# Patient Record
Sex: MALE | Race: WHITE | NOT HISPANIC OR LATINO | Employment: OTHER
[De-identification: names, ages, dates, MRNs, and addresses within clinical notes are randomized per-mention and may not be internally consistent; named-entity substitution may affect disease eponyms.]

---

## 2020-09-01 ENCOUNTER — OFFICE VISIT (OUTPATIENT)
Age: 80
End: 2020-09-01

## 2020-09-29 ENCOUNTER — OFFICE VISIT (OUTPATIENT)
Dept: URBAN - METROPOLITAN AREA CLINIC 9 | Facility: CLINIC | Age: 80
End: 2020-09-29

## 2020-10-07 ENCOUNTER — OFFICE VISIT (OUTPATIENT)
Dept: URBAN - METROPOLITAN AREA CLINIC 9 | Facility: CLINIC | Age: 80
End: 2020-10-07

## 2022-06-01 ENCOUNTER — OFFICE VISIT (OUTPATIENT)
Age: 82
End: 2022-06-01

## 2022-06-01 ENCOUNTER — TELEPHONE ENCOUNTER (OUTPATIENT)
Dept: URBAN - METROPOLITAN AREA CLINIC 9 | Facility: CLINIC | Age: 82
End: 2022-06-01

## 2022-06-06 ENCOUNTER — OFFICE VISIT (OUTPATIENT)
Dept: URBAN - METROPOLITAN AREA CLINIC 9 | Facility: CLINIC | Age: 82
End: 2022-06-06

## 2022-07-30 ENCOUNTER — TELEPHONE ENCOUNTER (OUTPATIENT)
Age: 82
End: 2022-07-30

## 2022-07-30 RX ORDER — OMEPRAZOLE 20 MG/1
2 (TWO) CAPSULE, DELAYED RELEASE ORAL DAILY
Qty: 0 | Refills: 2 | OUTPATIENT
Start: 2019-02-19 | End: 2019-03-21

## 2022-07-31 ENCOUNTER — TELEPHONE ENCOUNTER (OUTPATIENT)
Age: 82
End: 2022-07-31

## 2022-07-31 RX ORDER — OMEPRAZOLE 40 MG/1
1 (ONE) CAPSULE, DELAYED RELEASE ORAL
Qty: 0 | Refills: 16 | Status: ACTIVE | COMMUNITY
Start: 2020-09-29

## 2022-07-31 RX ORDER — OMEPRAZOLE 40 MG/1
1 (ONE) CAPSULE, DELAYED RELEASE ORAL
Qty: 0 | Refills: 16 | Status: ACTIVE | COMMUNITY
Start: 2022-06-06

## 2022-07-31 RX ORDER — OMEPRAZOLE 20 MG/1
2 (TWO) CAPSULE, DELAYED RELEASE ORAL DAILY
Qty: 0 | Refills: 2 | Status: ACTIVE | COMMUNITY
Start: 2019-02-19

## 2022-08-24 ENCOUNTER — WEB ENCOUNTER (OUTPATIENT)
Dept: URBAN - METROPOLITAN AREA CLINIC 9 | Facility: CLINIC | Age: 82
End: 2022-08-24

## 2022-08-24 ENCOUNTER — OFFICE VISIT (OUTPATIENT)
Dept: URBAN - METROPOLITAN AREA CLINIC 9 | Facility: CLINIC | Age: 82
End: 2022-08-24
Payer: MEDICARE

## 2022-08-24 VITALS
SYSTOLIC BLOOD PRESSURE: 118 MMHG | DIASTOLIC BLOOD PRESSURE: 76 MMHG | BODY MASS INDEX: 29.12 KG/M2 | WEIGHT: 208 LBS | HEIGHT: 71 IN

## 2022-08-24 DIAGNOSIS — R13.10 DYSPHAGIA, UNSPECIFIED TYPE: ICD-10-CM

## 2022-08-24 DIAGNOSIS — K22.70 BARRETT'S ESOPHAGUS WITHOUT DYSPLASIA: ICD-10-CM

## 2022-08-24 DIAGNOSIS — K91.89 GASTROJEJUNAL ANASTOMOTIC STRICTURE: ICD-10-CM

## 2022-08-24 PROCEDURE — 99214 OFFICE O/P EST MOD 30 MIN: CPT | Performed by: INTERNAL MEDICINE

## 2022-08-24 RX ORDER — MIRTAZAPINE 15 MG/1
TAKE ONE TABLET BY MOUTH EVERY NIGHT TABLET ORAL
Qty: 30 UNSPECIFIED | Refills: 3 | Status: ACTIVE | COMMUNITY

## 2022-08-24 RX ORDER — INSULIN GLARGINE 100 [IU]/ML
INJECT TWENTY UNITS UNDER THE SKIN ONE TIME DAILY AT NIGHT INJECTION, SOLUTION SUBCUTANEOUS
Qty: 15 UNSPECIFIED | Refills: 1 | Status: ACTIVE | COMMUNITY

## 2022-08-24 RX ORDER — OMEPRAZOLE 20 MG/1
1 TABLET CAPSULE, DELAYED RELEASE ORAL
Qty: 30 | Refills: 11 | OUTPATIENT
Start: 2019-02-19

## 2022-08-24 RX ORDER — TAMSULOSIN HYDROCHLORIDE 0.4 MG/1
TAKE ONE CAPSULE BY MOUTH ONE TIME DAILY CAPSULE ORAL
Qty: 90 UNSPECIFIED | Refills: 1 | Status: ACTIVE | COMMUNITY

## 2022-08-24 RX ORDER — METFORMIN HYDROCHLORIDE 500 MG/1
TAKE ONE TABLET BY MOUTH ONE TIME DAILY WITH BREAKFAST TABLET, EXTENDED RELEASE ORAL
Qty: 90 UNSPECIFIED | Refills: 0 | Status: ACTIVE | COMMUNITY

## 2022-08-24 RX ORDER — OMEPRAZOLE 40 MG/1
1 (ONE) CAPSULE, DELAYED RELEASE ORAL
Qty: 0 | Refills: 16 | Status: ON HOLD | COMMUNITY
Start: 2022-06-06

## 2022-08-24 RX ORDER — OMEPRAZOLE 20 MG/1
2 (TWO) CAPSULE, DELAYED RELEASE ORAL DAILY
Qty: 0 | Refills: 2 | Status: ACTIVE | COMMUNITY
Start: 2019-02-19

## 2022-08-24 RX ORDER — SITAGLIPTIN 100 MG/1
TABLET, FILM COATED ORAL
Qty: 90 TABLET | Status: ACTIVE | COMMUNITY

## 2022-08-24 RX ORDER — PRAVASTATIN SODIUM 10 MG/1
TAKE ONE TABLET BY MOUTH ONE TIME DAILY AT BEDTIME TABLET ORAL
Qty: 30 UNSPECIFIED | Refills: 0 | Status: ACTIVE | COMMUNITY

## 2022-08-24 NOTE — HPI-TODAY'S VISIT:
Pt here for evaluation of new dysphagia and Ordonez's.  . Pt with hx/o gricelda en y gastric bypass. . pt last colonoscopy was in 2010 and he is avg risk. . pt with esophgitis and dysphagia in 2016 with Ordonez's, dilation and started on PPI and now sx are much improved. his dysphagia has not recurred suggesting that it was due to GERD. . EGD 3/2019 with neg for ordonez's but prior EGDs were positive for ordonez's . he was doing well on PPI. . Pt here now with dysphagia new since 6/2022. He has no melena or weight changes but is having issues with the dysphagia and coughing., he is still on PPI. Due to lack of recent EGD, new onset dysphagia sx and Ordonez's EGD is warranted with dilation. Will arrange. He is agreeable.  .

## 2022-08-31 ENCOUNTER — CLAIMS CREATED FROM THE CLAIM WINDOW (OUTPATIENT)
Dept: URBAN - METROPOLITAN AREA SURGERY CENTER 9 | Facility: SURGERY CENTER | Age: 82
End: 2022-08-31
Payer: MEDICARE

## 2022-08-31 ENCOUNTER — CLAIMS CREATED FROM THE CLAIM WINDOW (OUTPATIENT)
Dept: URBAN - METROPOLITAN AREA CLINIC 8 | Facility: CLINIC | Age: 82
End: 2022-08-31
Payer: MEDICARE

## 2022-08-31 DIAGNOSIS — R13.10 DYSPHAGIA, UNSPECIFIED TYPE: ICD-10-CM

## 2022-08-31 DIAGNOSIS — Z98.0 H/O BILLROTH II OPERATION: ICD-10-CM

## 2022-08-31 DIAGNOSIS — K22.89 DILATATION OF ESOPHAGUS: ICD-10-CM

## 2022-08-31 DIAGNOSIS — R13.10 ABNORMAL DEGLUTITION: ICD-10-CM

## 2022-08-31 DIAGNOSIS — T18.128A FOOD IMPACTION OF ESOPHAGUS: ICD-10-CM

## 2022-08-31 PROBLEM — 40739000: Status: ACTIVE | Noted: 2022-08-24

## 2022-08-31 PROCEDURE — 88305 TISSUE EXAM BY PATHOLOGIST: CPT | Performed by: INTERNAL MEDICINE

## 2022-08-31 PROCEDURE — 43248 EGD GUIDE WIRE INSERTION: CPT | Performed by: CLINIC/CENTER

## 2022-08-31 PROCEDURE — 43248 EGD GUIDE WIRE INSERTION: CPT | Performed by: INTERNAL MEDICINE

## 2022-08-31 PROCEDURE — 43247 EGD REMOVE FOREIGN BODY: CPT | Performed by: INTERNAL MEDICINE

## 2022-08-31 PROCEDURE — 43247 EGD REMOVE FOREIGN BODY: CPT | Performed by: CLINIC/CENTER

## 2022-08-31 RX ORDER — MIRTAZAPINE 15 MG/1
TAKE ONE TABLET BY MOUTH EVERY NIGHT TABLET ORAL
Qty: 30 UNSPECIFIED | Refills: 3 | Status: ACTIVE | COMMUNITY

## 2022-08-31 RX ORDER — OMEPRAZOLE 40 MG/1
1 (ONE) CAPSULE, DELAYED RELEASE ORAL
Qty: 0 | Refills: 16 | Status: ON HOLD | COMMUNITY
Start: 2022-06-06

## 2022-08-31 RX ORDER — PRAVASTATIN SODIUM 10 MG/1
TAKE ONE TABLET BY MOUTH ONE TIME DAILY AT BEDTIME TABLET ORAL
Qty: 30 UNSPECIFIED | Refills: 0 | Status: ACTIVE | COMMUNITY

## 2022-08-31 RX ORDER — METFORMIN HYDROCHLORIDE 500 MG/1
TAKE ONE TABLET BY MOUTH ONE TIME DAILY WITH BREAKFAST TABLET, EXTENDED RELEASE ORAL
Qty: 90 UNSPECIFIED | Refills: 0 | Status: ACTIVE | COMMUNITY

## 2022-08-31 RX ORDER — INSULIN GLARGINE 100 [IU]/ML
INJECT TWENTY UNITS UNDER THE SKIN ONE TIME DAILY AT NIGHT INJECTION, SOLUTION SUBCUTANEOUS
Qty: 15 UNSPECIFIED | Refills: 1 | Status: ACTIVE | COMMUNITY

## 2022-08-31 RX ORDER — SITAGLIPTIN 100 MG/1
TABLET, FILM COATED ORAL
Qty: 90 TABLET | Status: ACTIVE | COMMUNITY

## 2022-08-31 RX ORDER — TAMSULOSIN HYDROCHLORIDE 0.4 MG/1
TAKE ONE CAPSULE BY MOUTH ONE TIME DAILY CAPSULE ORAL
Qty: 90 UNSPECIFIED | Refills: 1 | Status: ACTIVE | COMMUNITY

## 2022-08-31 RX ORDER — OMEPRAZOLE 20 MG/1
1 TABLET CAPSULE, DELAYED RELEASE ORAL
Qty: 30 | Refills: 11 | Status: ACTIVE | COMMUNITY
Start: 2019-02-19

## 2022-09-01 ENCOUNTER — LAB OUTSIDE AN ENCOUNTER (OUTPATIENT)
Dept: URBAN - METROPOLITAN AREA CLINIC 9 | Facility: CLINIC | Age: 82
End: 2022-09-01

## 2022-09-01 ENCOUNTER — TELEPHONE ENCOUNTER (OUTPATIENT)
Dept: URBAN - METROPOLITAN AREA CLINIC 9 | Facility: CLINIC | Age: 82
End: 2022-09-01

## 2022-11-18 ENCOUNTER — OFFICE VISIT (OUTPATIENT)
Dept: URBAN - METROPOLITAN AREA CLINIC 9 | Facility: CLINIC | Age: 82
End: 2022-11-18
Payer: MEDICARE

## 2022-11-18 VITALS
BODY MASS INDEX: 29.96 KG/M2 | DIASTOLIC BLOOD PRESSURE: 68 MMHG | SYSTOLIC BLOOD PRESSURE: 126 MMHG | WEIGHT: 214 LBS | HEIGHT: 71 IN

## 2022-11-18 DIAGNOSIS — K91.89 GASTROJEJUNAL ANASTOMOTIC STRICTURE: ICD-10-CM

## 2022-11-18 DIAGNOSIS — K22.70 BARRETT'S ESOPHAGUS WITHOUT DYSPLASIA: ICD-10-CM

## 2022-11-18 DIAGNOSIS — K22.0 ACHALASIA: ICD-10-CM

## 2022-11-18 PROBLEM — 45564002: Status: ACTIVE | Noted: 2022-11-18

## 2022-11-18 PROCEDURE — 99213 OFFICE O/P EST LOW 20 MIN: CPT | Performed by: INTERNAL MEDICINE

## 2022-11-18 RX ORDER — METFORMIN HYDROCHLORIDE 500 MG/1
TAKE ONE TABLET BY MOUTH ONE TIME DAILY WITH BREAKFAST TABLET, EXTENDED RELEASE ORAL
Qty: 90 UNSPECIFIED | Refills: 0 | Status: ACTIVE | COMMUNITY

## 2022-11-18 RX ORDER — PRAVASTATIN SODIUM 10 MG/1
TAKE ONE TABLET BY MOUTH ONE TIME DAILY AT BEDTIME TABLET ORAL
Qty: 30 UNSPECIFIED | Refills: 0 | Status: ACTIVE | COMMUNITY

## 2022-11-18 RX ORDER — MIRTAZAPINE 15 MG/1
TAKE ONE TABLET BY MOUTH EVERY NIGHT TABLET ORAL
Qty: 30 UNSPECIFIED | Refills: 3 | Status: ACTIVE | COMMUNITY

## 2022-11-18 RX ORDER — SITAGLIPTIN 100 MG/1
TABLET, FILM COATED ORAL
Qty: 90 TABLET | Status: ACTIVE | COMMUNITY

## 2022-11-18 RX ORDER — OMEPRAZOLE 40 MG/1
1 (ONE) CAPSULE, DELAYED RELEASE ORAL
Qty: 0 | Refills: 16 | Status: ON HOLD | COMMUNITY
Start: 2022-06-06

## 2022-11-18 RX ORDER — OMEPRAZOLE 20 MG/1
1 TABLET CAPSULE, DELAYED RELEASE ORAL
OUTPATIENT
Start: 2019-02-19

## 2022-11-18 RX ORDER — TAMSULOSIN HYDROCHLORIDE 0.4 MG/1
TAKE ONE CAPSULE BY MOUTH ONE TIME DAILY CAPSULE ORAL
Qty: 90 UNSPECIFIED | Refills: 1 | Status: ACTIVE | COMMUNITY

## 2022-11-18 RX ORDER — INSULIN GLARGINE 100 [IU]/ML
INJECT TWENTY UNITS UNDER THE SKIN ONE TIME DAILY AT NIGHT INJECTION, SOLUTION SUBCUTANEOUS
Qty: 15 UNSPECIFIED | Refills: 1 | Status: ACTIVE | COMMUNITY

## 2022-11-18 RX ORDER — OMEPRAZOLE 20 MG/1
1 TABLET CAPSULE, DELAYED RELEASE ORAL
Qty: 30 | Refills: 11 | Status: ACTIVE | COMMUNITY
Start: 2019-02-19

## 2022-11-18 NOTE — HPI-TODAY'S VISIT:
Pt here for f/u of Ordonez's and dysphagia . Pt with hx/o gricelda en y gastric bypass. . pt last colonoscopy was in 2010 and he is avg risk. . pt with esophgitis and dysphagia in 2016 with Ordonez's, dilation and started on PPI and now sx are much improved. his dysphagia has not recurred suggesting that it was due to GERD. . EGD 3/2019 with neg for ordonez's but prior EGDs were positive for ordonez's 2022 EGD with pennies in esophagus, suggestion of achalasia. Bx neg for cancer or ordonez's. . he was doing well on PPI. . Pt here for f/u. he is doing great and not having any more dysphagia. We discussed his possible achalasia. He desires no interevetion or testing at this point given lack of symptoms. Will cont PPI for ordonez's and rtc 1 year. he will call if his dysphagia returns.  .

## 2023-04-04 ENCOUNTER — WEB ENCOUNTER (OUTPATIENT)
Dept: URBAN - METROPOLITAN AREA CLINIC 9 | Facility: CLINIC | Age: 83
End: 2023-04-04

## 2023-04-04 ENCOUNTER — OFFICE VISIT (OUTPATIENT)
Dept: URBAN - METROPOLITAN AREA CLINIC 9 | Facility: CLINIC | Age: 83
End: 2023-04-04
Payer: MEDICARE

## 2023-04-04 VITALS
DIASTOLIC BLOOD PRESSURE: 82 MMHG | SYSTOLIC BLOOD PRESSURE: 140 MMHG | WEIGHT: 222 LBS | HEIGHT: 71 IN | BODY MASS INDEX: 31.08 KG/M2

## 2023-04-04 DIAGNOSIS — J30.89 NON-SEASONAL ALLERGIC RHINITIS, UNSPECIFIED TRIGGER: ICD-10-CM

## 2023-04-04 DIAGNOSIS — K91.89 GASTROJEJUNAL ANASTOMOTIC STRICTURE: ICD-10-CM

## 2023-04-04 DIAGNOSIS — K22.70 BARRETT'S ESOPHAGUS WITHOUT DYSPLASIA: ICD-10-CM

## 2023-04-04 PROBLEM — 61582004: Status: ACTIVE | Noted: 2023-04-04

## 2023-04-04 PROCEDURE — 99214 OFFICE O/P EST MOD 30 MIN: CPT | Performed by: INTERNAL MEDICINE

## 2023-04-04 RX ORDER — OMEPRAZOLE 40 MG/1
1 (ONE) CAPSULE, DELAYED RELEASE ORAL
Qty: 0 | Refills: 16 | Status: ON HOLD | COMMUNITY
Start: 2022-06-06

## 2023-04-04 RX ORDER — OMEPRAZOLE 20 MG/1
1 TABLET CAPSULE, DELAYED RELEASE ORAL
Status: ACTIVE | COMMUNITY
Start: 2019-02-19

## 2023-04-04 RX ORDER — MIRTAZAPINE 15 MG/1
TAKE ONE TABLET BY MOUTH EVERY NIGHT TABLET ORAL
Qty: 30 UNSPECIFIED | Refills: 3 | Status: ACTIVE | COMMUNITY

## 2023-04-04 RX ORDER — OMEPRAZOLE 40 MG/1
1 TABLET CAPSULE, DELAYED RELEASE ORAL
Qty: 30 | Refills: 5 | OUTPATIENT
Start: 2019-02-19

## 2023-04-04 RX ORDER — PRAVASTATIN SODIUM 10 MG/1
TAKE ONE TABLET BY MOUTH ONE TIME DAILY AT BEDTIME TABLET ORAL
Qty: 30 UNSPECIFIED | Refills: 0 | Status: ACTIVE | COMMUNITY

## 2023-04-04 RX ORDER — INSULIN GLARGINE 100 [IU]/ML
INJECT TWENTY UNITS UNDER THE SKIN ONE TIME DAILY AT NIGHT INJECTION, SOLUTION SUBCUTANEOUS
Qty: 15 UNSPECIFIED | Refills: 1 | Status: ACTIVE | COMMUNITY

## 2023-04-04 RX ORDER — TAMSULOSIN HYDROCHLORIDE 0.4 MG/1
TAKE ONE CAPSULE BY MOUTH ONE TIME DAILY CAPSULE ORAL
Qty: 90 UNSPECIFIED | Refills: 1 | Status: ACTIVE | COMMUNITY

## 2023-04-04 RX ORDER — SITAGLIPTIN 100 MG/1
TABLET, FILM COATED ORAL
Qty: 90 TABLET | Status: ACTIVE | COMMUNITY

## 2023-04-04 RX ORDER — METFORMIN HYDROCHLORIDE 500 MG/1
TAKE ONE TABLET BY MOUTH ONE TIME DAILY WITH BREAKFAST TABLET, EXTENDED RELEASE ORAL
Qty: 90 UNSPECIFIED | Refills: 0 | Status: ACTIVE | COMMUNITY

## 2023-04-04 NOTE — HPI-TODAY'S VISIT:
Pt here for eval of worsening gerd and f/u of Ordonez's and new sinusitis . Pt with hx/o gricelda en y gastric bypass. . pt last colonoscopy was in 2010 and he is avg risk. . pt with esophgitis and dysphagia in 2016 with Ordonez's, dilation and started on PPI and now sx are much improved. his dysphagia has not recurred suggesting that it was due to GERD. . EGD 3/2019 with neg for ordonez's but prior EGDs were positive for ordonez's 2022 EGD with pennies in esophagus, suggestion of achalasia. Bx neg for cancer or ordonez's. . he was doing well on PPI but now has lost response. . Pt here for f/u. he was on ppi 40 mg but for unclear reasons he is back on 20 mg. he has had worsened coughing but is having consideralbe nasal discharge though does have coughing after eating suggesting a gerd component. i advised we increase omeprazole to 40 mg and that we refer to ENT for evaluation for allergic rhinitis. he is agreeable.  .

## 2023-06-21 ENCOUNTER — OFFICE VISIT (OUTPATIENT)
Dept: URBAN - METROPOLITAN AREA CLINIC 9 | Facility: CLINIC | Age: 83
End: 2023-06-21
Payer: MEDICARE

## 2023-06-21 VITALS
HEIGHT: 71 IN | DIASTOLIC BLOOD PRESSURE: 78 MMHG | BODY MASS INDEX: 30.66 KG/M2 | WEIGHT: 219 LBS | SYSTOLIC BLOOD PRESSURE: 130 MMHG

## 2023-06-21 DIAGNOSIS — K91.89 GASTROJEJUNAL ANASTOMOTIC STRICTURE: ICD-10-CM

## 2023-06-21 DIAGNOSIS — K22.0 ACHALASIA: ICD-10-CM

## 2023-06-21 DIAGNOSIS — K22.70 BARRETT'S ESOPHAGUS WITHOUT DYSPLASIA: ICD-10-CM

## 2023-06-21 PROCEDURE — 99213 OFFICE O/P EST LOW 20 MIN: CPT | Performed by: INTERNAL MEDICINE

## 2023-06-21 RX ORDER — ALUMINUM ZIRCONIUM TRICHLOROHYDREX GLY 0.19 G/G
2 TAB STICK TOPICAL ONCE A DAY
Status: ACTIVE | COMMUNITY

## 2023-06-21 RX ORDER — MIRTAZAPINE 15 MG/1
TAKE ONE TABLET BY MOUTH EVERY NIGHT TABLET ORAL
Qty: 30 UNSPECIFIED | Refills: 3 | Status: ACTIVE | COMMUNITY

## 2023-06-21 RX ORDER — INSULIN GLARGINE 100 [IU]/ML
INJECT TWENTY UNITS UNDER THE SKIN ONE TIME DAILY AT NIGHT INJECTION, SOLUTION SUBCUTANEOUS
Qty: 15 UNSPECIFIED | Refills: 1 | Status: ACTIVE | COMMUNITY

## 2023-06-21 RX ORDER — SITAGLIPTIN 100 MG/1
TABLET, FILM COATED ORAL
Qty: 90 TABLET | Status: ACTIVE | COMMUNITY

## 2023-06-21 RX ORDER — FAMOTIDINE 20 MG/1
1 TABLET AT BEDTIME AS NEEDED TABLET, FILM COATED ORAL ONCE A DAY
Qty: 30 | OUTPATIENT
Start: 2023-06-21

## 2023-06-21 RX ORDER — OMEPRAZOLE 40 MG/1
1 TABLET CAPSULE, DELAYED RELEASE ORAL
Qty: 30 | Refills: 5 | Status: ACTIVE | COMMUNITY
Start: 2019-02-19

## 2023-06-21 RX ORDER — OMEPRAZOLE 40 MG/1
1 TABLET CAPSULE, DELAYED RELEASE ORAL
OUTPATIENT
Start: 2019-02-19

## 2023-06-21 RX ORDER — PRAVASTATIN SODIUM 10 MG/1
TAKE ONE TABLET BY MOUTH ONE TIME DAILY AT BEDTIME TABLET ORAL
Qty: 30 UNSPECIFIED | Refills: 0 | Status: ACTIVE | COMMUNITY

## 2023-06-21 RX ORDER — TAMSULOSIN HYDROCHLORIDE 0.4 MG/1
TAKE ONE CAPSULE BY MOUTH ONE TIME DAILY CAPSULE ORAL
Qty: 90 UNSPECIFIED | Refills: 1 | Status: ACTIVE | COMMUNITY

## 2023-06-21 RX ORDER — METFORMIN HYDROCHLORIDE 500 MG/1
TAKE ONE TABLET BY MOUTH ONE TIME DAILY WITH BREAKFAST TABLET, EXTENDED RELEASE ORAL
Qty: 90 UNSPECIFIED | Refills: 0 | Status: ACTIVE | COMMUNITY

## 2023-06-21 NOTE — HPI-TODAY'S VISIT:
Pt here for f/u of cough and gerd and dysphagia. . Pt with hx/o gricelda en y gastric bypass. . pt last colonoscopy was in 2010 and he is avg risk. pt with esophgitis and dysphagia in 2016 with Ordonez's, dilation and started on PPI and now sx are much improved. his dysphagia has not recurred suggesting that it was due to GERD. EGD 3/2019 with neg for ordonez's but prior EGDs were positive for ordonez's 2022 EGD with pennies in esophagus, suggestion of achalasia. Bx neg for cancer or ordonez's. . he was doing well on PPI but now has lost response. . At last visit we increased ppi to 40 mg and added h2 blocker QHS and he is 80% improved. His upper GI showed distal tertiary spasms, delayed esophageal emptying and birds beak suggestive of achalasia. We discussed this finding but given age etc an aggressive achalasia approach would not be ideal. We could consider botox with EGD but he is much improved on higher dose ppi. For now will hold on EGD and cont 40 mg po and h2 blocker QHS. He is agreeable. RTC  6 months.  .

## 2023-10-18 ENCOUNTER — TELEPHONE ENCOUNTER (OUTPATIENT)
Dept: URBAN - METROPOLITAN AREA CLINIC 7 | Facility: CLINIC | Age: 83
End: 2023-10-18

## 2023-10-18 RX ORDER — OMEPRAZOLE 40 MG/1
1 TABLET CAPSULE, DELAYED RELEASE ORAL
Qty: 30 | Refills: 5
Start: 2019-02-19

## 2023-12-20 ENCOUNTER — OFFICE VISIT (OUTPATIENT)
Dept: URBAN - METROPOLITAN AREA CLINIC 9 | Facility: CLINIC | Age: 83
End: 2023-12-20

## 2024-01-10 ENCOUNTER — DASHBOARD ENCOUNTERS (OUTPATIENT)
Age: 84
End: 2024-01-10

## 2024-01-10 ENCOUNTER — TELEPHONE ENCOUNTER (OUTPATIENT)
Dept: URBAN - METROPOLITAN AREA CLINIC 7 | Facility: CLINIC | Age: 84
End: 2024-01-10

## 2024-01-10 ENCOUNTER — OFFICE VISIT (OUTPATIENT)
Dept: URBAN - METROPOLITAN AREA CLINIC 9 | Facility: CLINIC | Age: 84
End: 2024-01-10
Payer: MEDICARE

## 2024-01-10 VITALS
DIASTOLIC BLOOD PRESSURE: 78 MMHG | BODY MASS INDEX: 32.06 KG/M2 | WEIGHT: 229 LBS | SYSTOLIC BLOOD PRESSURE: 128 MMHG | HEIGHT: 71 IN

## 2024-01-10 DIAGNOSIS — K22.0 ACHALASIA: ICD-10-CM

## 2024-01-10 DIAGNOSIS — K22.70 BARRETT'S ESOPHAGUS WITHOUT DYSPLASIA: ICD-10-CM

## 2024-01-10 PROCEDURE — 99214 OFFICE O/P EST MOD 30 MIN: CPT | Performed by: INTERNAL MEDICINE

## 2024-01-10 RX ORDER — OMEPRAZOLE 40 MG/1
1 TABLET CAPSULE, DELAYED RELEASE ORAL
OUTPATIENT
Start: 2019-02-19

## 2024-01-10 RX ORDER — METFORMIN HYDROCHLORIDE 500 MG/1
TAKE ONE TABLET BY MOUTH ONE TIME DAILY WITH BREAKFAST TABLET, EXTENDED RELEASE ORAL
Qty: 90 UNSPECIFIED | Refills: 0 | Status: ACTIVE | COMMUNITY

## 2024-01-10 RX ORDER — TAMSULOSIN HYDROCHLORIDE 0.4 MG/1
TAKE ONE CAPSULE BY MOUTH ONE TIME DAILY CAPSULE ORAL
Qty: 90 UNSPECIFIED | Refills: 1 | Status: ACTIVE | COMMUNITY

## 2024-01-10 RX ORDER — MIRTAZAPINE 15 MG/1
TAKE ONE TABLET BY MOUTH EVERY NIGHT TABLET ORAL
Qty: 30 UNSPECIFIED | Refills: 3 | Status: ACTIVE | COMMUNITY

## 2024-01-10 RX ORDER — ALUMINUM ZIRCONIUM TRICHLOROHYDREX GLY 0.19 G/G
2 TAB STICK TOPICAL ONCE A DAY
Status: ACTIVE | COMMUNITY

## 2024-01-10 RX ORDER — PRAVASTATIN SODIUM 10 MG/1
TAKE ONE TABLET BY MOUTH ONE TIME DAILY AT BEDTIME TABLET ORAL
Qty: 30 UNSPECIFIED | Refills: 0 | Status: ACTIVE | COMMUNITY

## 2024-01-10 RX ORDER — FAMOTIDINE 20 MG/1
1 TABLET AT BEDTIME AS NEEDED TABLET, FILM COATED ORAL ONCE A DAY
Qty: 30 | Status: ACTIVE | COMMUNITY
Start: 2023-06-21

## 2024-01-10 RX ORDER — SITAGLIPTIN 100 MG/1
TABLET, FILM COATED ORAL
Qty: 90 TABLET | Status: ACTIVE | COMMUNITY

## 2024-01-10 RX ORDER — OMEPRAZOLE 40 MG/1
1 TABLET CAPSULE, DELAYED RELEASE ORAL
Qty: 30 | Refills: 5 | Status: ACTIVE | COMMUNITY
Start: 2019-02-19

## 2024-01-10 RX ORDER — INSULIN GLARGINE 100 [IU]/ML
INJECT TWENTY UNITS UNDER THE SKIN ONE TIME DAILY AT NIGHT INJECTION, SOLUTION SUBCUTANEOUS
Qty: 15 UNSPECIFIED | Refills: 1 | Status: ACTIVE | COMMUNITY

## 2024-01-10 RX ORDER — FAMOTIDINE 20 MG/1
1 TABLET AT BEDTIME AS NEEDED TABLET, FILM COATED ORAL ONCE A DAY
OUTPATIENT
Start: 2023-06-21

## 2024-01-10 NOTE — HPI-TODAY'S VISIT:
Pt here for f/u of cough and gerd and dysphagia. . Pt with hx/o gricelda en y gastric bypass. . pt last colonoscopy was in 2010 and he is avg risk. pt with esophgitis and dysphagia in 2016 with Ordonez's, dilation and started on PPI and now sx are much improved. his dysphagia has not recurred suggesting that it was due to GERD. EGD 3/2019 with neg for ordonez's but prior EGDs were positive for ordonez's 2022 EGD with pennies in esophagus, suggestion of achalasia. Bx neg for cancer or ordonez's. . he was doing well on PPI but now has lost response. . Pt here for evaluation. he is still on PPI 40 mg and still has some degree of ongoing symptoms. He still has some dysphagia and gerd at times. We had discussed EGD with botox and we will proceed as he feels there are some degree of sx despite high dose PPI. Will arrange and rtc 6 mo. .

## 2024-07-02 ENCOUNTER — P2P PATIENT RECORD (OUTPATIENT)
Age: 84
End: 2024-07-02

## 2024-10-29 ENCOUNTER — TELEPHONE ENCOUNTER (OUTPATIENT)
Dept: URBAN - METROPOLITAN AREA CLINIC 9 | Facility: CLINIC | Age: 84
End: 2024-10-29

## 2024-10-29 RX ORDER — FAMOTIDINE 20 MG/1
1 TABLET AT BEDTIME AS NEEDED TABLET, FILM COATED ORAL ONCE A DAY
Qty: 90 TABLET | Refills: 0
Start: 2023-06-21

## 2024-10-29 RX ORDER — OMEPRAZOLE 40 MG/1
1 TABLET CAPSULE, DELAYED RELEASE ORAL
Qty: 90 | Refills: 0
Start: 2019-02-19